# Patient Record
Sex: MALE
[De-identification: names, ages, dates, MRNs, and addresses within clinical notes are randomized per-mention and may not be internally consistent; named-entity substitution may affect disease eponyms.]

---

## 2023-07-17 PROBLEM — Z00.00 ENCOUNTER FOR PREVENTIVE HEALTH EXAMINATION: Status: ACTIVE | Noted: 2023-07-17

## 2023-07-18 ENCOUNTER — APPOINTMENT (OUTPATIENT)
Dept: RADIOLOGY | Facility: CLINIC | Age: 33
End: 2023-07-18

## 2023-07-18 ENCOUNTER — RESULT REVIEW (OUTPATIENT)
Age: 33
End: 2023-07-18

## 2023-07-18 ENCOUNTER — OUTPATIENT (OUTPATIENT)
Dept: OUTPATIENT SERVICES | Facility: HOSPITAL | Age: 33
LOS: 1 days | End: 2023-07-18
Payer: COMMERCIAL

## 2023-07-18 ENCOUNTER — APPOINTMENT (OUTPATIENT)
Dept: ORTHOPEDIC SURGERY | Facility: CLINIC | Age: 33
End: 2023-07-18
Payer: COMMERCIAL

## 2023-07-18 VITALS
BODY MASS INDEX: 29.99 KG/M2 | SYSTOLIC BLOOD PRESSURE: 128 MMHG | RESPIRATION RATE: 18 BRPM | HEIGHT: 65 IN | OXYGEN SATURATION: 100 % | WEIGHT: 180 LBS | HEART RATE: 66 BPM | DIASTOLIC BLOOD PRESSURE: 85 MMHG

## 2023-07-18 DIAGNOSIS — M25.521 PAIN IN RIGHT ELBOW: ICD-10-CM

## 2023-07-18 PROCEDURE — 99204 OFFICE O/P NEW MOD 45 MIN: CPT

## 2023-07-18 PROCEDURE — 73080 X-RAY EXAM OF ELBOW: CPT | Mod: 26,RT

## 2023-08-08 ENCOUNTER — APPOINTMENT (OUTPATIENT)
Dept: ORTHOPEDIC SURGERY | Facility: CLINIC | Age: 33
End: 2023-08-08

## 2023-08-09 ENCOUNTER — TRANSCRIPTION ENCOUNTER (OUTPATIENT)
Age: 33
End: 2023-08-09

## 2023-08-09 ENCOUNTER — APPOINTMENT (OUTPATIENT)
Dept: ORTHOPEDIC SURGERY | Facility: CLINIC | Age: 33
End: 2023-08-09
Payer: COMMERCIAL

## 2023-08-09 PROCEDURE — 99212 OFFICE O/P EST SF 10 MIN: CPT

## 2023-08-09 NOTE — HISTORY OF PRESENT ILLNESS
[de-identified] : PT Seen and examined. Patient presents today for evaluation of right hand pain and numbness. Patient is complaining of transient painful paresthesias at night. Pain and sensation loss in the median nerve distribution. He denies any inciting event or trauma. Denies any prior injuries to the head or neck region. Denies any prior surgery to the head or neck region. He has not been seen for evaluated by any other medical provider for this painful paresthesia. No nerve conduction test or EMG have been performed. He has had no formal therapy, injections, medical treatment or surgery to address this issue. He is here for further evaluation and management.  8/9/23  Patient seen and examined.  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  Patient states has been compliant with the night splints at the elbow and the wrist as well as physical therapy working on nerve gliding exercises.  He has been inconsistently taking the Mobic.  He states that he has had significant improvement in his paresthesias such that he does not wake up with numb hands anymore.  He states that he noticed a subjective improvement in his symptoms as well as improvement in strength.  He denies any fevers chills night sweats.  Denies any current numbness or tingling right upper extremity.  He is here for further evaluation and management.

## 2023-08-09 NOTE — DISCUSSION/SUMMARY
[de-identified] : Medication risks reviewed. Patient seen and examined. Patient presents today for evaluation of numbness and tingling in right upper extremity. Based on his history, physical examination, imaging I discussed with patient that I am concerned for a median nerve compressive neuropathy with potential double crush phenomenon. I discussed with him the spectrum of symptomatology from positional paresthesias to with baseline numbness and weakness. At this current moment in time his symptoms are positional and mostly exacerbated with sleeping as well as some degrees of hyper extension of the wrist. I discussed with him that I would like him to try a course of nonsteroidal anti-inflammatories, Mobic prescription provided p.o., physical therapy to focus on nerve gliding exercises, and nighttime carpal tunnel splints and activity modification for the next 3 to 4 weeks. Should his symptoms fail to improve or worsen we will consider EMG testing as well as further evaluation of his neck and advanced imaging of the wrist to ensure there is no other concomitant pathology that could be explaining his symptomatology prior to more invasive measures. All questions asked and answered. Patient is in agreement with the plan. Patient will plan to follow-up accordingly.  Plan for next visit: 1. Assess median nerve distribution symptoms, assess any weakness, consider EMG and MRI of wrist/C-spine.   Patient should follow up in 4 weeks.  August 9, 2023: Patient seen and examined.  Patient states that he has had significant improvement in the numbness in his hands/paresthesias with the night splints and physical therapy.  He will continue physical therapy the night splints and anti-inflammatories to help decrease any associated inflammation.  I would like him to continue this for another 3 to 4 weeks.  I would like to see him back in 3 weeks.  Should his symptoms fail to improve or worsen at that time we will consider EMG testing, nerve conduction testing potentially MRI of his neck to assess for nerve entrapment.  All questions asked and answered.  Patient is in agreement with the plan.  He will follow-up accordingly.  Plan for next visit: Assess his median and ulnar nerve related symptoms assess for any weakness consider EMG/MRI of wrist and C-spine.

## 2023-08-09 NOTE — PHYSICAL EXAM
[de-identified] :  33-year-old male appropriate for stated age.  Negative Spurling's test to the right with improvement of symptoms when looking to the left.    Wrist Flexion: 5 out of 5  Wrist Extension: 5 out of 5  Interossei: 5 out of 5  : 5 out of 5  Pinch: 5 out of 5 improved compared to last visit    Provocative Testing:  Milking: Negative  Moving Valgus Stress: Negative  Chair Rise Test: Negative  Hook Test: Negative  Resisted Wrist Extension: No Pain  Resisted Index Finger Extension: No Pain  Resisted Middle Finger Extension: No Pain  Resisted Wrist Flexion: No Pain  Resisted Supination: No Pain    Neurologic Exam:  Axillary Nerve: Sensation intact to light touch  Radial Nerve: Sensation intact to light touch  Median Nerve: Decreased sensation to light touch  Ulnar Nerve: Sensation intact to light touch  Other:  negative Radhika's/phallen's test. Negative flexion and cubital tunnel compression test. No thenar atrophy appreciated.    Vascular Exam:  Radial Pulse: 2+  Ulnar Pulse: 2+  Capillary Refill: <2 Seconds  Other Exams: None  Pertinent Contralateral Elbow Findings: None

## 2023-08-14 ENCOUNTER — RX RENEWAL (OUTPATIENT)
Age: 33
End: 2023-08-14

## 2023-08-14 RX ORDER — MELOXICAM 7.5 MG/1
7.5 TABLET ORAL
Qty: 30 | Refills: 0 | Status: ACTIVE | COMMUNITY
Start: 2023-07-18 | End: 1900-01-01

## 2023-09-12 ENCOUNTER — APPOINTMENT (OUTPATIENT)
Dept: ORTHOPEDIC SURGERY | Facility: CLINIC | Age: 33
End: 2023-09-12
Payer: COMMERCIAL

## 2023-09-12 VITALS
BODY MASS INDEX: 29.99 KG/M2 | DIASTOLIC BLOOD PRESSURE: 71 MMHG | HEIGHT: 65 IN | OXYGEN SATURATION: 97 % | HEART RATE: 52 BPM | WEIGHT: 180 LBS | SYSTOLIC BLOOD PRESSURE: 110 MMHG

## 2023-09-12 DIAGNOSIS — G58.7 MONONEURITIS MULTIPLEX: ICD-10-CM

## 2023-09-12 DIAGNOSIS — G56.00 CARPAL TUNNEL SYNDROME, UNSPECIFIED UPPER LIMB: ICD-10-CM

## 2023-09-12 PROCEDURE — 99213 OFFICE O/P EST LOW 20 MIN: CPT

## 2023-10-25 ENCOUNTER — OUTPATIENT (OUTPATIENT)
Dept: OUTPATIENT SERVICES | Facility: HOSPITAL | Age: 33
LOS: 1 days | End: 2023-10-25

## 2023-10-25 ENCOUNTER — RESULT REVIEW (OUTPATIENT)
Age: 33
End: 2023-10-25

## 2023-10-25 ENCOUNTER — APPOINTMENT (OUTPATIENT)
Dept: ORTHOPEDIC SURGERY | Facility: CLINIC | Age: 33
End: 2023-10-25
Payer: COMMERCIAL

## 2023-10-25 ENCOUNTER — APPOINTMENT (OUTPATIENT)
Dept: RADIOLOGY | Facility: CLINIC | Age: 33
End: 2023-10-25
Payer: COMMERCIAL

## 2023-10-25 DIAGNOSIS — M25.571 PAIN IN RIGHT ANKLE AND JOINTS OF RIGHT FOOT: ICD-10-CM

## 2023-10-25 DIAGNOSIS — Q66.70 CONGEN PES CAVUS, UNSP FOOT: ICD-10-CM

## 2023-10-25 DIAGNOSIS — G89.29 PAIN IN RIGHT ANKLE AND JOINTS OF RIGHT FOOT: ICD-10-CM

## 2023-10-25 DIAGNOSIS — M62.461 CONTRACTURE OF MUSCLE, RIGHT LOWER LEG: ICD-10-CM

## 2023-10-25 DIAGNOSIS — M62.462 CONTRACTURE OF MUSCLE, LEFT LOWER LEG: ICD-10-CM

## 2023-10-25 PROCEDURE — 73610 X-RAY EXAM OF ANKLE: CPT | Mod: 26,RT

## 2023-10-25 PROCEDURE — 99214 OFFICE O/P EST MOD 30 MIN: CPT | Mod: 25

## 2023-10-25 RX ORDER — MELOXICAM 7.5 MG/1
7.5 TABLET ORAL
Qty: 30 | Refills: 0 | Status: ACTIVE | COMMUNITY
Start: 2023-10-25 | End: 1900-01-01

## 2023-12-06 ENCOUNTER — APPOINTMENT (OUTPATIENT)
Dept: ORTHOPEDIC SURGERY | Facility: CLINIC | Age: 33
End: 2023-12-06

## 2025-03-27 ENCOUNTER — APPOINTMENT (OUTPATIENT)
Dept: HUMAN REPRODUCTION | Facility: CLINIC | Age: 35
End: 2025-03-27

## 2025-04-16 ENCOUNTER — APPOINTMENT (OUTPATIENT)
Dept: HUMAN REPRODUCTION | Facility: CLINIC | Age: 35
End: 2025-04-16

## 2025-04-16 PROCEDURE — 89322 SEMEN ANAL STRICT CRITERIA: CPT
